# Patient Record
Sex: MALE | Race: WHITE
[De-identification: names, ages, dates, MRNs, and addresses within clinical notes are randomized per-mention and may not be internally consistent; named-entity substitution may affect disease eponyms.]

---

## 2018-10-10 ENCOUNTER — HOSPITAL ENCOUNTER (OUTPATIENT)
Dept: HOSPITAL 92 - SDC | Age: 63
Discharge: HOME | End: 2018-10-10
Attending: OTOLARYNGOLOGY
Payer: COMMERCIAL

## 2018-10-10 VITALS — BODY MASS INDEX: 27.2 KG/M2

## 2018-10-10 DIAGNOSIS — D11.0: Primary | ICD-10-CM

## 2018-10-10 PROCEDURE — 0CT80ZZ RESECTION OF RIGHT PAROTID GLAND, OPEN APPROACH: ICD-10-PCS | Performed by: OTOLARYNGOLOGY

## 2018-10-10 PROCEDURE — 93010 ELECTROCARDIOGRAM REPORT: CPT

## 2018-10-10 PROCEDURE — 93005 ELECTROCARDIOGRAM TRACING: CPT

## 2018-10-10 PROCEDURE — 96374 THER/PROPH/DIAG INJ IV PUSH: CPT

## 2018-10-10 PROCEDURE — 88307 TISSUE EXAM BY PATHOLOGIST: CPT

## 2018-10-11 NOTE — OP
DATE OF PROCEDURE:  10/15/2018

 

PREOPERATIVE DIAGNOSIS:  Right parotid mass.

 

POSTOPERATIVE DIAGNOSIS:  Right parotid mass.

 

PROCEDURES:

1.  Right total parotidectomy.

2.  Intraoperative facial nerve monitor.

 

SURGEON:  Abisai Kerns M.D.

 

ESTIMATED BLOOD LOSS:  50 mL.

 

COMPLICATIONS:  None.

 

ANESTHESIA:  GETA.

 

PROCEDURE IN DETAIL:  The patient was taken to the operating room and placed supine on the table.  Ge
neral endotracheal anesthesia obtained by the Anesthesia staff.  Tube was secured in the left lower l
ip.  The shoulder roll was placed and the head was gently tilted to expose the right parotid area.  F
ollowing this, the patient was prepped and draped in standard surgical fashion.  The intraoperative f
acial nerve monitor was then inserted into the _____ muscles.  The nerve monitor was tested and was n
oted to be working and remained on throughout the procedure.  Following this, an incision was made wi
th a modified Arturo incision in the preauricular crease and then extending around the _____ neck crea
se that is approximately 2 cm below the angle of the mandible.  The technique was used to elevate a s
kin flap over the left parotid area.  Following this, the parotid gland was released from the sternoc
leidomastoid inferiorly and dissection was carried medially down the tragal cartilage to the tympanom
astoid suture line.  Following this, the facial nerve was identified and was dissected laterally unti
l superior and inferior divisions were identified.  Following this, the gland was freed from its vilma
chments.  The mass was noted to be between the upper and lower divisions of the facial nerve, it was 
also noted to be extending deep to the facial nerve into the deep level of the parotid gland.  This g
land was removed as the nerves were dissected and freed from this area.  Wound was irrigated and drai
n was placed and skin was closed using a combination of 3-0 and 4-0 Monocryl stitches and 4-0 and 5-0
 Prolene stitches for the skin.

## 2022-08-09 ENCOUNTER — HOSPITAL ENCOUNTER (EMERGENCY)
Dept: HOSPITAL 92 - ERS | Age: 67
Discharge: HOME | End: 2022-08-09
Payer: COMMERCIAL

## 2022-08-09 DIAGNOSIS — I10: ICD-10-CM

## 2022-08-09 DIAGNOSIS — R50.9: Primary | ICD-10-CM

## 2022-08-09 DIAGNOSIS — Z20.822: ICD-10-CM

## 2022-08-09 DIAGNOSIS — E78.5: ICD-10-CM

## 2022-08-09 DIAGNOSIS — F17.210: ICD-10-CM

## 2022-08-09 LAB
ALBUMIN SERPL BCG-MCNC: 3.7 G/DL (ref 3.4–4.8)
ALP SERPL-CCNC: 174 U/L (ref 40–110)
ALT SERPL W P-5'-P-CCNC: 58 U/L (ref 8–55)
ANION GAP SERPL CALC-SCNC: 13 MMOL/L (ref 10–20)
AST SERPL-CCNC: 70 U/L (ref 5–34)
BASOPHILS # BLD AUTO: 0 THOU/UL (ref 0–0.2)
BASOPHILS NFR BLD AUTO: 0.1 % (ref 0–1)
BILIRUB SERPL-MCNC: 1.3 MG/DL (ref 0.2–1.2)
BUN SERPL-MCNC: 18 MG/DL (ref 8.4–25.7)
CALCIUM SERPL-MCNC: 8.7 MG/DL (ref 7.8–10.44)
CHLORIDE SERPL-SCNC: 103 MMOL/L (ref 98–107)
CO2 SERPL-SCNC: 24 MMOL/L (ref 23–31)
CREAT CL PREDICTED SERPL C-G-VRATE: 0 ML/MIN (ref 70–130)
EOSINOPHIL # BLD AUTO: 0 THOU/UL (ref 0–0.7)
EOSINOPHIL NFR BLD AUTO: 0.1 % (ref 0–10)
GLOBULIN SER CALC-MCNC: 3.1 G/DL (ref 2.4–3.5)
GLUCOSE SERPL-MCNC: 108 MG/DL (ref 80–115)
GLUCOSE UR STRIP-MCNC: 30 MG/DL
HGB BLD-MCNC: 14.7 G/DL (ref 14–18)
LYMPHOCYTES # BLD: 1.2 THOU/UL (ref 1.2–3.4)
LYMPHOCYTES NFR BLD AUTO: 11.4 % (ref 21–51)
MCH RBC QN AUTO: 31.9 PG (ref 27–31)
MCV RBC AUTO: 95 FL (ref 78–98)
MONOCYTES # BLD AUTO: 1.1 THOU/UL (ref 0.11–0.59)
MONOCYTES NFR BLD AUTO: 10.8 % (ref 0–10)
NEUTROPHILS # BLD AUTO: 8.2 THOU/UL (ref 1.4–6.5)
NEUTROPHILS NFR BLD AUTO: 77.5 % (ref 42–75)
PLATELET # BLD AUTO: 163 THOU/UL (ref 130–400)
POTASSIUM SERPL-SCNC: 3.8 MMOL/L (ref 3.5–5.1)
PROT UR STRIP.AUTO-MCNC: 30 MG/DL
RBC # BLD AUTO: 4.6 MILL/UL (ref 4.7–6.1)
RBC UR QL AUTO: (no result) HPF (ref 0–3)
SODIUM SERPL-SCNC: 136 MMOL/L (ref 136–145)
SP GR UR STRIP: 1.03 (ref 1–1.04)
WBC # BLD AUTO: 10.5 THOU/UL (ref 4.8–10.8)
WBC UR QL AUTO: (no result) HPF (ref 0–3)

## 2022-08-09 PROCEDURE — 0240U: CPT

## 2022-08-09 PROCEDURE — 83605 ASSAY OF LACTIC ACID: CPT

## 2022-08-09 PROCEDURE — 81015 MICROSCOPIC EXAM OF URINE: CPT

## 2022-08-09 PROCEDURE — 99283 EMERGENCY DEPT VISIT LOW MDM: CPT

## 2022-08-09 PROCEDURE — 36415 COLL VENOUS BLD VENIPUNCTURE: CPT

## 2022-08-09 PROCEDURE — 81003 URINALYSIS AUTO W/O SCOPE: CPT

## 2022-08-09 PROCEDURE — 80053 COMPREHEN METABOLIC PANEL: CPT

## 2022-08-09 PROCEDURE — 85025 COMPLETE CBC W/AUTO DIFF WBC: CPT

## 2022-08-31 ENCOUNTER — HOSPITAL ENCOUNTER (OUTPATIENT)
Dept: HOSPITAL 92 - CSHCT | Age: 67
Discharge: HOME | End: 2022-08-31
Attending: FAMILY MEDICINE
Payer: COMMERCIAL

## 2022-08-31 DIAGNOSIS — R91.8: ICD-10-CM

## 2022-08-31 DIAGNOSIS — R93.89: Primary | ICD-10-CM

## 2022-08-31 LAB — EGFRCR SERPLBLD CKD-EPI 2021: 61 ML/MIN/{1.73_M2}

## 2022-08-31 PROCEDURE — 82565 ASSAY OF CREATININE: CPT

## 2022-08-31 PROCEDURE — 71260 CT THORAX DX C+: CPT

## 2023-07-26 ENCOUNTER — HOSPITAL ENCOUNTER (OUTPATIENT)
Dept: HOSPITAL 92 - BICCT | Age: 68
Discharge: HOME | End: 2023-07-26
Payer: COMMERCIAL

## 2023-07-26 DIAGNOSIS — J43.9: Primary | ICD-10-CM

## 2023-07-26 DIAGNOSIS — F17.200: ICD-10-CM

## 2023-07-26 DIAGNOSIS — R91.1: ICD-10-CM

## 2023-07-26 DIAGNOSIS — J98.4: ICD-10-CM

## 2023-07-26 LAB — EGFRCR SERPLBLD CKD-EPI 2021: 41 ML/MIN/{1.73_M2}

## 2023-07-26 PROCEDURE — 71260 CT THORAX DX C+: CPT

## 2023-07-26 PROCEDURE — 82565 ASSAY OF CREATININE: CPT

## 2023-12-07 ENCOUNTER — HOSPITAL ENCOUNTER (EMERGENCY)
Dept: HOSPITAL 18 - NAV ERS | Age: 68
LOS: 1 days | Discharge: HOME | End: 2023-12-08
Payer: COMMERCIAL

## 2023-12-07 DIAGNOSIS — M54.31: Primary | ICD-10-CM

## 2023-12-07 DIAGNOSIS — F17.210: ICD-10-CM

## 2023-12-07 DIAGNOSIS — I10: ICD-10-CM

## 2023-12-07 PROCEDURE — 99283 EMERGENCY DEPT VISIT LOW MDM: CPT

## 2024-02-09 ENCOUNTER — HOSPITAL ENCOUNTER (EMERGENCY)
Dept: HOSPITAL 18 - NAV ERS | Age: 69
Discharge: HOME | End: 2024-02-09
Payer: COMMERCIAL

## 2024-02-09 DIAGNOSIS — I10: ICD-10-CM

## 2024-02-09 DIAGNOSIS — S39.012A: Primary | ICD-10-CM

## 2024-02-09 DIAGNOSIS — F17.210: ICD-10-CM
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
## 2024-02-09 PROCEDURE — 72131 CT LUMBAR SPINE W/O DYE: CPT
